# Patient Record
Sex: FEMALE | Race: ASIAN | NOT HISPANIC OR LATINO | URBAN - METROPOLITAN AREA
[De-identification: names, ages, dates, MRNs, and addresses within clinical notes are randomized per-mention and may not be internally consistent; named-entity substitution may affect disease eponyms.]

---

## 2021-03-23 ENCOUNTER — EMERGENCY (EMERGENCY)
Facility: HOSPITAL | Age: 26
LOS: 1 days | Discharge: HOME | End: 2021-03-23
Attending: EMERGENCY MEDICINE | Admitting: EMERGENCY MEDICINE
Payer: OTHER MISCELLANEOUS

## 2021-03-23 VITALS
SYSTOLIC BLOOD PRESSURE: 121 MMHG | HEART RATE: 76 BPM | RESPIRATION RATE: 18 BRPM | OXYGEN SATURATION: 98 % | HEIGHT: 65 IN | DIASTOLIC BLOOD PRESSURE: 82 MMHG | WEIGHT: 119.93 LBS | TEMPERATURE: 98 F

## 2021-03-23 DIAGNOSIS — S59.911A UNSPECIFIED INJURY OF RIGHT FOREARM, INITIAL ENCOUNTER: ICD-10-CM
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
## 2021-03-23 DIAGNOSIS — M79.631 PAIN IN RIGHT FOREARM: ICD-10-CM

## 2021-03-23 DIAGNOSIS — S50.11XA CONTUSION OF RIGHT FOREARM, INITIAL ENCOUNTER: ICD-10-CM
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
## 2021-03-23 PROCEDURE — 73090 X-RAY EXAM OF FOREARM: CPT | Mod: 26,RT

## 2021-03-23 PROCEDURE — 73090 X-RAY EXAM OF FOREARM: CPT | Mod: 26

## 2021-03-23 PROCEDURE — 99283 EMERGENCY DEPT VISIT LOW MDM: CPT | Mod: 25

## 2021-03-23 NOTE — ED PROVIDER NOTE - OBJECTIVE STATEMENT
26 F presenting with pain the R distal forearm. She was at work 2 days ago, a colleague slammed he arm in a door by mistake. The pain has increased so she came to get checked/be sure that nothing is broken. Took some ibuprofen before arriving. No numbness.

## 2021-03-23 NOTE — ED ADULT NURSE NOTE - OBJECTIVE STATEMENT
Pt presents c/o 4/10 RUE pain after shutting in door several days ago.  Pt has FROM, no deformity noted.  Pt is pending imaging.

## 2021-03-23 NOTE — ED PROVIDER NOTE - CLINICAL SUMMARY MEDICAL DECISION MAKING FREE TEXT BOX
Patient presenting with R forearm pain after getting it slammed in a door at work. Will check forearm xrays. Counselled on contusion care.

## 2021-03-23 NOTE — ED PROVIDER NOTE - MUSCULOSKELETAL, MLM
+ diffuse distal forearm ttp in region of the swelling, no wrist tenderness or pain with ROM of the wrist.

## 2021-03-23 NOTE — ED PROVIDER NOTE - NSFOLLOWUPINSTRUCTIONS_ED_ALL_ED_FT
Contusion    A contusion is a deep bruise. Contusions are the result of a blunt injury to tissues and muscle fibers under the skin. The skin overlying the contusion may turn blue, purple, or yellow. Symptoms also include pain and swelling in the injured area.    SEEK IMMEDIATE MEDICAL CARE IF YOU HAVE ANY OF THE FOLLOWING SYMPTOMS: severe pain, numbness, tingling, pain, weakness, or skin color/temperature change in any part of your body distal to the injury. Contusion    A contusion is a deep bruise. Contusions are the result of a blunt injury to tissues and muscle fibers under the skin. The skin overlying the contusion may turn blue, purple, or yellow. Symptoms also include pain and swelling in the injured area.    SEEK IMMEDIATE MEDICAL CARE IF YOU HAVE ANY OF THE FOLLOWING SYMPTOMS: severe pain, numbness, tingling, pain, weakness, or skin color/temperature change in any part of your body distal to the injury.    OTC Motrin/Advil (ibuprofen) 600mg every 6h and/or Tylenol (acetaminophen) 1000mg every 6h for pain.   Return for worse pain, new worrisome symptoms or other medical emergencies.     Consider follow up with Dr. Cohen or Dr. Sanchez at [facility]. They are osteopathic MDs who can do bone and tissue adjustments as well as set up high quality physical therapy.   [address], [place] [address]  Phone: [phone]    ACE wrap for comfort and support, can lead to some hand swelling.

## 2021-03-23 NOTE — ED ADULT TRIAGE NOTE - CHIEF COMPLAINT QUOTE
Patient to ED with complaint of right arm pain s/p shutting in door several days ago.  No obvious deformity

## 2021-03-23 NOTE — ED PROVIDER NOTE - PATIENT PORTAL LINK FT
You can access the FollowMyHealth Patient Portal offered by [facility] by registering at the following website: [email or web address]. By joining [employer]’s FollowMyHealth portal, you will also be able to view your health information using other applications (apps) compatible with our system.
